# Patient Record
Sex: FEMALE | Race: WHITE | Employment: OTHER | URBAN - METROPOLITAN AREA
[De-identification: names, ages, dates, MRNs, and addresses within clinical notes are randomized per-mention and may not be internally consistent; named-entity substitution may affect disease eponyms.]

---

## 2021-01-15 ENCOUNTER — APPOINTMENT (OUTPATIENT)
Dept: CT IMAGING | Age: 86
End: 2021-01-15
Payer: MEDICARE

## 2021-01-15 ENCOUNTER — APPOINTMENT (OUTPATIENT)
Dept: GENERAL RADIOLOGY | Age: 86
End: 2021-01-15
Payer: MEDICARE

## 2021-01-15 ENCOUNTER — HOSPITAL ENCOUNTER (EMERGENCY)
Age: 86
Discharge: HOME | End: 2021-01-16
Attending: EMERGENCY MEDICINE
Payer: MEDICARE

## 2021-01-15 DIAGNOSIS — S01.01XA LACERATION OF SCALP, INITIAL ENCOUNTER: ICD-10-CM
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
## 2021-01-15 LAB
ANION GAP SERPL CALCULATED.3IONS-SCNC: 9 MMOL/L (ref 3–16)
BASOPHILS ABSOLUTE: 0 K/UL (ref 0–0.2)
BASOPHILS RELATIVE PERCENT: 0.6 %
BUN BLDV-MCNC: 25 MG/DL (ref 7–20)
CALCIUM SERPL-MCNC: 10.4 MG/DL (ref 8.3–10.6)
CHLORIDE BLD-SCNC: 104 MMOL/L (ref 99–110)
CO2: 27 MMOL/L (ref 21–32)
CREAT SERPL-MCNC: 1.2 MG/DL (ref 0.6–1.2)
EOSINOPHILS ABSOLUTE: 0.2 K/UL (ref 0–0.6)
EOSINOPHILS RELATIVE PERCENT: 2.4 %
GFR AFRICAN AMERICAN: 51
GFR NON-AFRICAN AMERICAN: 42
GLUCOSE BLD-MCNC: 112 MG/DL (ref 70–99)
HCT VFR BLD CALC: 43.7 % (ref 36–48)
HEMOGLOBIN: 14.3 G/DL (ref 12–16)
LYMPHOCYTES ABSOLUTE: 2 K/UL (ref 1–5.1)
LYMPHOCYTES RELATIVE PERCENT: 25.8 %
MCH RBC QN AUTO: 31.5 PG (ref 26–34)
MCHC RBC AUTO-ENTMCNC: 32.7 G/DL (ref 31–36)
MCV RBC AUTO: 96.3 FL (ref 80–100)
MONOCYTES ABSOLUTE: 0.8 K/UL (ref 0–1.3)
MONOCYTES RELATIVE PERCENT: 10 %
NEUTROPHILS ABSOLUTE: 4.7 K/UL (ref 1.7–7.7)
NEUTROPHILS RELATIVE PERCENT: 61.2 %
PDW BLD-RTO: 14.9 % (ref 12.4–15.4)
PLATELET # BLD: 215 K/UL (ref 135–450)
PMV BLD AUTO: 8 FL (ref 5–10.5)
POTASSIUM REFLEX MAGNESIUM: 4.4 MMOL/L (ref 3.5–5.1)
PRO-BNP: 581 PG/ML (ref 0–449)
RBC # BLD: 4.53 M/UL (ref 4–5.2)
SODIUM BLD-SCNC: 140 MMOL/L (ref 136–145)
TROPONIN: <0.01 NG/ML
WBC # BLD: 7.7 K/UL (ref 4–11)

## 2021-01-15 PROCEDURE — 36415 COLL VENOUS BLD VENIPUNCTURE: CPT

## 2021-01-15 PROCEDURE — 80048 BASIC METABOLIC PNL TOTAL CA: CPT

## 2021-01-15 PROCEDURE — 12001 RPR S/N/AX/GEN/TRNK 2.5CM/<: CPT

## 2021-01-15 PROCEDURE — 90471 IMMUNIZATION ADMIN: CPT | Performed by: EMERGENCY MEDICINE

## 2021-01-15 PROCEDURE — 93005 ELECTROCARDIOGRAM TRACING: CPT | Performed by: EMERGENCY MEDICINE

## 2021-01-15 PROCEDURE — 85025 COMPLETE CBC W/AUTO DIFF WBC: CPT

## 2021-01-15 PROCEDURE — 2500000003 HC RX 250 WO HCPCS: Performed by: EMERGENCY MEDICINE

## 2021-01-15 PROCEDURE — 70450 CT HEAD/BRAIN W/O DYE: CPT

## 2021-01-15 PROCEDURE — 84484 ASSAY OF TROPONIN QUANT: CPT

## 2021-01-15 PROCEDURE — 6360000002 HC RX W HCPCS: Performed by: EMERGENCY MEDICINE

## 2021-01-15 PROCEDURE — 99283 EMERGENCY DEPT VISIT LOW MDM: CPT

## 2021-01-15 PROCEDURE — 72125 CT NECK SPINE W/O DYE: CPT

## 2021-01-15 PROCEDURE — 71045 X-RAY EXAM CHEST 1 VIEW: CPT

## 2021-01-15 PROCEDURE — 83880 ASSAY OF NATRIURETIC PEPTIDE: CPT

## 2021-01-15 PROCEDURE — 90715 TDAP VACCINE 7 YRS/> IM: CPT | Performed by: EMERGENCY MEDICINE

## 2021-01-15 RX ORDER — LIDOCAINE HYDROCHLORIDE AND EPINEPHRINE 10; 10 MG/ML; UG/ML
20 INJECTION, SOLUTION INFILTRATION; PERINEURAL ONCE
Status: DISCONTINUED | OUTPATIENT
Start: 2021-01-15 | End: 2021-01-16 | Stop reason: HOSPADM

## 2021-01-15 RX ADMIN — TETANUS TOXOID, REDUCED DIPHTHERIA TOXOID AND ACELLULAR PERTUSSIS VACCINE, ADSORBED 0.5 ML: 5; 2.5; 8; 8; 2.5 SUSPENSION INTRAMUSCULAR at 23:50

## 2021-01-15 RX ADMIN — LIDOCAINE HYDROCHLORIDE 5 ML: 10 INJECTION, SOLUTION EPIDURAL; INFILTRATION; INTRACAUDAL; PERINEURAL at 23:50

## 2021-01-15 SDOH — HEALTH STABILITY: MENTAL HEALTH: HOW OFTEN DO YOU HAVE A DRINK CONTAINING ALCOHOL?: NEVER

## 2021-01-15 ASSESSMENT — PAIN SCALES - GENERAL: PAINLEVEL_OUTOF10: 0

## 2021-01-16 VITALS
HEART RATE: 70 BPM | TEMPERATURE: 98.8 F | WEIGHT: 125 LBS | BODY MASS INDEX: 18.94 KG/M2 | SYSTOLIC BLOOD PRESSURE: 184 MMHG | RESPIRATION RATE: 16 BRPM | HEIGHT: 68 IN | DIASTOLIC BLOOD PRESSURE: 82 MMHG | OXYGEN SATURATION: 95 %

## 2021-01-16 LAB
EKG ATRIAL RATE: 60 BPM
EKG DIAGNOSIS: NORMAL
EKG P AXIS: 61 DEGREES
EKG P-R INTERVAL: 164 MS
EKG Q-T INTERVAL: 422 MS
EKG QRS DURATION: 78 MS
EKG QTC CALCULATION (BAZETT): 422 MS
EKG R AXIS: 14 DEGREES
EKG T AXIS: 113 DEGREES
EKG VENTRICULAR RATE: 60 BPM

## 2021-01-16 ASSESSMENT — ENCOUNTER SYMPTOMS
NAUSEA: 0
WHEEZING: 0
EYE PAIN: 0
ABDOMINAL PAIN: 0
DIARRHEA: 0
COUGH: 0
SHORTNESS OF BREATH: 0
VOMITING: 0

## 2021-01-16 NOTE — ED NOTES
Patient asked several times to try to urinate, patient states \" I do not have to go to the bathroom. \" MD notified.      Allanesha Smith-Narcisse, RN  01/16/21 0028

## 2021-01-16 NOTE — ED PROVIDER NOTES
She reports that she has never smoked. She has never used smokeless tobacco. She reports previous alcohol use. She reports that she does not use drugs. Medications     There are no discharge medications for this patient. Allergies     She has No Known Allergies. Physical Exam     ED Triage Vitals   Enc Vitals Group      BP 01/15/21 2203 (!) 184/82      Pulse 01/15/21 2200 70      Resp 01/15/21 2200 16      Temp 01/15/21 2200 98.8 °F (37.1 °C)      Temp Source 01/15/21 2200 Oral      SpO2 01/15/21 2200 100 %      Weight 01/15/21 2200 125 lb (56.7 kg)      Height 01/15/21 2200 5' 8\" (1.727 m)      Head Circumference --       Peak Flow --       Pain Score --       Pain Loc --       Pain Edu? --       Excl. in [address]? --      General:  Non-toxic, no acute distress, fully cooperative with my exam    HEENT: No hemotympanum. Pupils equal and reactive to light. Neck:  Supple, no midline cervical spinal tenderness. Pulmonary:   No increased work of breathing; CTAB    Cardiac:  RRR, soft systolic murmur. Capillary refill <3s. 2+ distal pulses    Abdomen:  Soft, nontender, nondistended; no focal rebound or guarding    Musculoskeletal:  Grossly intact without obvious injury or deformity    Neuro: Patient is a GCS of 15. She is awake alert and oriented to person and place but not time. She exhibits no cranial nerve deficits. She shows a 5/5 strength of bilateral upper and lower extremities. Skin: There is an approximately 1 cm occipital scalp laceration      Diagnostic Results     EKG   Sinus rhythm with a sinus arrhythmia. No acute ST or T wave changes. Overall, nonspecific EKG with no signs of ischemia or significant arrhythmia    RADIOLOGY:  XR CHEST PORTABLE   Final Result   No pneumothorax. Faint atelectasis present. Degenerative changes are    present in the spine, as well as a levoscoliotic curvature. No definite    fracture identified. .          CT Cervical Spine WO Contrast   Final Result Degenerative disc disease present, but no fracture identified. CT Head WO Contrast   Final Result   Volume loss and microvascular changes are present. No intracranial    hemorrhage          LABS:   Results for orders placed or performed during the hospital encounter of 01/15/21   CBC Auto Differential   Result Value Ref Range    WBC 7.7 4.0 - 11.0 K/uL    RBC 4.53 4.00 - 5.20 M/uL    Hemoglobin 14.3 12.0 - 16.0 g/dL    Hematocrit 43.7 36.0 - 48.0 %    MCV 96.3 80.0 - 100.0 fL    MCH 31.5 26.0 - 34.0 pg    MCHC 32.7 31.0 - 36.0 g/dL    RDW 14.9 12.4 - 15.4 %    Platelets 171 197 - 436 K/uL    MPV 8.0 5.0 - 10.5 fL    Neutrophils % 61.2 %    Lymphocytes % 25.8 %    Monocytes % 10.0 %    Eosinophils % 2.4 %    Basophils % 0.6 %    Neutrophils Absolute 4.7 1.7 - 7.7 K/uL    Lymphocytes Absolute 2.0 1.0 - 5.1 K/uL    Monocytes Absolute 0.8 0.0 - 1.3 K/uL    Eosinophils Absolute 0.2 0.0 - 0.6 K/uL    Basophils Absolute 0.0 0.0 - 0.2 K/uL   Basic Metabolic Panel w/ Reflex to MG   Result Value Ref Range    Sodium 140 136 - 145 mmol/L    Potassium reflex Magnesium 4.4 3.5 - 5.1 mmol/L    Chloride 104 99 - 110 mmol/L    CO2 27 21 - 32 mmol/L    Anion Gap 9 3 - 16    Glucose 112 (H) 70 - 99 mg/dL    BUN 25 (H) 7 - 20 mg/dL    CREATININE 1.2 0.6 - 1.2 mg/dL    GFR Non-African American 42 (A) >60    GFR  51 (A) >60    Calcium 10.4 8.3 - 10.6 mg/dL   Troponin   Result Value Ref Range    Troponin <0.01 <0.01 ng/mL   Brain Natriuretic Peptide   Result Value Ref Range    Pro- (H) 0 - 449 pg/mL       RECENT VITALS:  BP: (!) 184/82, Temp: 98.8 °F (37.1 °C), Pulse: 70, Resp: 16, SpO2: 95 %     Procedures     Lac Repair    Date/Time: 1/16/2021 12:06 AM  Performed by: Yaritza Carpio MD  Authorized by: Yaritza Carpio MD     Consent:     Consent obtained:  Verbal  Anesthesia (see MAR for exact dosages):      Anesthesia method:  Local infiltration    Local anesthetic:  Lidocaine 1% w/o epi Narciso Lu is a 80 y.o. female who presents with a fall of unclear etiology potentially mechanical.  Nothing on history to suggest syncopal event though cannot be ruled out. EKG shows sinus arrhythmia. Her renal panel is at baseline with no electrolyte abnormalities. Troponin not elevated. CT of the head is read by the radiologist as no evidence of intracranial hemorrhage. C-spine shows no evidence of traumatic injury with some significant degenerative changes consistent with her age. Her exam is reassuring for neurologic standpoint and she appears to be at her baseline. Tetanus is updated. Wound was thoroughly irrigated and repaired with 2 staples at bedside. I discussed with  that this could be a mechanical fall but that I cannot totally rule out syncope. We discussed potential further inpatient evaluation of syncope but he would prefer to go home and follow-up with her primary care provider this week. 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 He understands the potential serious risk of missed arrhythmia which could lead to sudden death. Does have a soft slight murmur but appears to have had some aortic regurgitation on echo from 2017 which could be the culprit. Is also mildly hypertensive. Given that she has good outpatient follow-up and  prefer for her to go home I felt this was appropriate but have given them clear return precautions. Clinical Impression     1. Fall, initial encounter    2. Laceration of scalp, initial encounter        Disposition     PATIENT REFERRED TO:  Makenna W [place] [phone]    In 1 week  For suture removal      DISCHARGE MEDICATIONS:  There are no discharge medications for this patient.       Elba Goodson MD 01/16/21 0245

## 2021-01-16 NOTE — ED TRIAGE NOTES
Patient arrived to ED from home after unwitnessed fall. Patient hit her head on ceramic model on floor. Pt has wound to mid posterior head, moderate bleeding. Pt has hx. Of beginning stages of Alzheimer's and states she does not remember exactly what happened. Pt is A&Ox4.

## 2021-01-16 NOTE — ED NOTES
Patient prepared for and ready to be discharged. Dressed in clothes and given belongings. IV removed, pt tolerated well, no complications. Patient discharged at this time in no acute distress after she verbalized understanding of discharge instructions. Reviewed medications, and when to return to the ED with patient. Encouraged follow up with PCP  Patient walked to lobby, Family to take patient home.        Allanesha Smith-Narcisse, RN  01/16/21 0462